# Patient Record
Sex: FEMALE | Race: BLACK OR AFRICAN AMERICAN | URBAN - METROPOLITAN AREA
[De-identification: names, ages, dates, MRNs, and addresses within clinical notes are randomized per-mention and may not be internally consistent; named-entity substitution may affect disease eponyms.]

---

## 2022-03-20 PROBLEM — E11.65 TYPE 2 DIABETES MELLITUS WITH HYPERGLYCEMIA, WITHOUT LONG-TERM CURRENT USE OF INSULIN (HCC): Status: ACTIVE | Noted: 2017-03-20

## 2022-11-21 LAB
CREATININE, EXTERNAL: 0.77
HBA1C MFR BLD HPLC: 6.9 %
LDL CHOLESTEROL, EXTERNAL: 54
MICROALBUMIN URINE, EXTERNAL: 68.2

## 2023-03-28 ENCOUNTER — VIRTUAL VISIT (OUTPATIENT)
Dept: ENDOCRINOLOGY | Age: 63
End: 2023-03-28
Payer: OTHER GOVERNMENT

## 2023-03-28 DIAGNOSIS — E11.65 TYPE 2 DIABETES MELLITUS WITH HYPERGLYCEMIA, WITHOUT LONG-TERM CURRENT USE OF INSULIN (HCC): Primary | ICD-10-CM

## 2023-03-28 DIAGNOSIS — E78.2 MIXED HYPERLIPIDEMIA: ICD-10-CM

## 2023-03-28 DIAGNOSIS — I10 ESSENTIAL HYPERTENSION: ICD-10-CM

## 2023-03-28 PROCEDURE — 99214 OFFICE O/P EST MOD 30 MIN: CPT | Performed by: INTERNAL MEDICINE

## 2023-03-28 RX ORDER — RISANKIZUMAB-RZAA 150 MG/ML
INJECTION SUBCUTANEOUS
COMMUNITY
Start: 2022-12-27

## 2023-03-28 NOTE — PROGRESS NOTES
Adolfo Anders is a 58 y.o. female here for   Chief Complaint   Patient presents with    Diabetes       1. Have you been to the ER or an urgent care clinic since your last visit?  - no    2. Have you been hospitalized since your last visit? - no    3. Have you seen or consulted any other health care providers outside of the [address] [place] [address] since your last visit?   Include any pap smears or colon screening.-  cardiologist, neurologist, GYN

## 2023-03-28 NOTE — PROGRESS NOTES
Jessica Galvan MD        Patient Information  Date:3/29/2023  Name : Mala Yoder 58 y.o.     YOB: 1960         Referred by: MIHIR Champion       Pursuant to the emergency declaration under the [facility], [facility] waiver authority and the [employer] Act, this Virtual  Visit was conducted, with patient's consent, to reduce the patient's risk of exposure to COVID-19 . Patient  is aware that this is a billable encounter and is responsible for copays/deductibles       Services were provided through a video synchronous discussion virtually to substitute for in-person clinic visit. Place of service: Provider : Office  Patient: Home  Chief Complaint   Patient presents with    Diabetes           History of Present Illness: Mala Yoder is a 58 y.o. female here for fu of  Type 2 Diabetes Mellitus. Type 2 Diabetes was diagnosed in 2009  . Cardiovascular risk factors: diabetes mellitus   Monitoring frequency:1-2 /day, fasting < 120   CVA January 2021 s/p carotid endarterectomy  Cold symptoms and hence had to change to virtual visit  Having some diarrhea  On biologics for psoriasis   Compliant with the medications  No hypoglycemia    Psoriasis: Followed by dermatology    Wt Readings from Last 3 Encounters:   07/13/22 155 lb (70.3 kg)   02/28/22 159 lb (72.1 kg)   10/27/21 161 lb (73 kg)       BP Readings from Last 3 Encounters:   07/13/22 127/69   02/28/22 113/62   10/27/21 (!) 115/57           Past Medical History:   Diagnosis Date    Diabetes ([facility] [address]     Psoriasis     Stroke [facility] 01/2021    4 strokes     Current Outpatient Medications   Medication Sig    empagliflozin-metformin (Synjardy XR) 12.5-1,000 mg TBph Take 1 Tablet by mouth two (2) times a day.     dulaglutide (Trulicity) 1.5 ZN/0.9 mL sub-q pen 0.5 mL by SubCUTAneous route every seven (7) days. Stop 0.75 mg    Skyrizi 150 mg/mL pnij Once every 3 months    clopidogreL (PLAVIX) 75 mg tab Take 75 mg by mouth daily. atorvastatin (Lipitor) 40 mg tablet Take 1 Tablet by mouth nightly. amLODIPine-valsartan (EXFORGE) 5-320 mg per tablet Take 1 Tablet by mouth daily. levETIRAcetam (KEPPRA) 500 mg tablet Take 1,000 mg by mouth two (2) times a day. clobetasol (TEMOVATE) 0.05 % ointment Apply  to affected area as needed for Skin Irritation. glucose blood VI test strips (ONETOUCH ULTRA TEST) strip Test blood glucose 2 times daily Dx Code: E11.65    Lancets misc Test blood glucose twice daily Dx Code: E11.65    Biotin 2,500 mcg cap Take 5,000 mcg by mouth daily. triamcinolone (KENALOG) 0.1 % lotion Apply  to affected area as needed. use thin layer (Patient not taking: Reported on 3/28/2023)     No current facility-administered medications for this visit. No Known Allergies      Review of Systems:  Per HPI     Physical Examination:   There were no vitals taken for this visit. Estimated body mass index is 27.46 kg/m² as calculated from the following:    Height as of 7/13/22: 5' 3\" (1.6 m). Weight as of 7/13/22: 155 lb (70.3 kg). General: pleasant, no distress, good eye contact  HEENT: no exophthalmos, no periorbital edema, EOMI  Neck: No visible thyromegaly  RS: Normal respiratory effort  Musculoskeletal: no tremors  Neurological: alert and oriented  Psychiatric: normal mood and affect  Skin: Normal color  Data Reviewed:         A1C 14 10/15    Assessment/Plan:     1. Type 2 diabetes mellitus with hyperglycemia, without long-term current use of insulin ([address]    2. Mixed hyperlipidemia    3. Essential hypertension          1.  Type 2 Diabetes Mellitus   Lab Results   Component Value Date/Time    Hemoglobin A1c 7.2 (H) 11/27/2019 08:20 AM    Hemoglobin A1c (POC) 6.4 07/13/2022 03:26 PM    Hemoglobin A1c, External 7.7 02/25/2022 12:00 AM   controlled, A1c 2023 6.8  Synjardy -trulicity:  T6Z 11/2023 6.9      FLU annually ,Pneumovax ,aspirin daily,annual eye exam,microalbumin    2. HTN :  Prinzide    3 Hyperlipidemia -statin    4 Psoriasis - followed by Derm    5. Carotid artery stenosis - s/p endarterectomy     6. CVA:    Dr Margie Vance - Cardiologist     Diarrhea: Could be medication related, could hold Synjardy for 2 weeks to see if it is related to the metformin, patient not interested    There are no Patient Instructions on file for this visit. Follow-up and Dispositions    Return for 4-5 months f/up with fasting labs bnv. Thank you for allowing me to participate in the care of this patient.     Hassan Dakin, MD    Patient verbalized understanding

## 2023-03-28 NOTE — Clinical Note
4 to 5 months follow-up visit, labs before next visit
no abnormal taste sensation/no gum bleeding/no nose bleeds/no tinnitus

## 2023-03-29 RX ORDER — EMPAGLIFLOZIN, METFORMIN HYDROCHLORIDE 12.5; 1 MG/1; MG/1
1 TABLET, EXTENDED RELEASE ORAL 2 TIMES DAILY
Qty: 180 EACH | Refills: 3 | Status: SHIPPED | OUTPATIENT
Start: 2023-03-29

## 2023-03-29 RX ORDER — DULAGLUTIDE 1.5 MG/.5ML
1.5 INJECTION, SOLUTION SUBCUTANEOUS
Qty: 6 ML | Refills: 3 | Status: SHIPPED | OUTPATIENT
Start: 2023-03-29

## 2023-04-23 DIAGNOSIS — E11.65 TYPE 2 DIABETES MELLITUS WITH HYPERGLYCEMIA, WITHOUT LONG-TERM CURRENT USE OF INSULIN (HCC): Primary | ICD-10-CM

## 2023-04-23 DIAGNOSIS — E78.2 MIXED HYPERLIPIDEMIA: ICD-10-CM

## 2023-04-24 DIAGNOSIS — E78.2 MIXED HYPERLIPIDEMIA: ICD-10-CM

## 2023-04-24 DIAGNOSIS — E11.65 TYPE 2 DIABETES MELLITUS WITH HYPERGLYCEMIA, WITHOUT LONG-TERM CURRENT USE OF INSULIN (HCC): Primary | ICD-10-CM

## 2023-05-23 DIAGNOSIS — E11.65 TYPE 2 DIABETES MELLITUS WITH HYPERGLYCEMIA, WITHOUT LONG-TERM CURRENT USE OF INSULIN (HCC): Primary | ICD-10-CM

## 2023-05-23 RX ORDER — ATORVASTATIN CALCIUM 40 MG/1
40 TABLET, FILM COATED ORAL DAILY
Qty: 90 TABLET | Refills: 3 | Status: SHIPPED | OUTPATIENT
Start: 2023-05-23

## 2023-05-24 NOTE — TELEPHONE ENCOUNTER
Left VM informing pt rx has been sent in as requested. A callback number was left for any questions or concerns.

## 2023-07-18 ENCOUNTER — TELEPHONE (OUTPATIENT)
Age: 63
End: 2023-07-18

## 2023-07-18 NOTE — TELEPHONE ENCOUNTER
Pt left VM stating she needs an alternative for her Synjardy. She feels like she is choking to death when trying to take it and it is causing a lot of irritation. She did not take her dose for today as she is worried about choking. She tried cutting it in half in the past but stopped as she found out it is time released and she's not supposed to do so. Would like bonifacio back asap. With an alternate smaller tab.

## 2023-07-18 NOTE — TELEPHONE ENCOUNTER
Attempted to call. Unsuccessful. Pt's full name on VM. Left detailed msg with Dr. Carpio Exon note. A callback number was left for any questions or concerns.

## 2023-07-18 NOTE — TELEPHONE ENCOUNTER
Although it is extended release she can still cut it if it is bothering her to swallow     If she does not cut it works 24 hours, if she cuts the pill at work 12 hours and hence she has to take 1 pill with breakfast and 1 pill with dinner      If she wants to change it then Jardiance 25 mg in the morning  Metformin extended release 750 mg twice daily with the food

## 2023-07-19 NOTE — TELEPHONE ENCOUNTER
Informed pt of Dr. Liv Voss note. Pt verbalized understanding with no further questions or concerns at this time.

## 2023-09-12 ENCOUNTER — OFFICE VISIT (OUTPATIENT)
Age: 63
End: 2023-09-12
Payer: OTHER GOVERNMENT

## 2023-09-12 VITALS
SYSTOLIC BLOOD PRESSURE: 124 MMHG | BODY MASS INDEX: 27.57 KG/M2 | WEIGHT: 155.6 LBS | TEMPERATURE: 97.8 F | OXYGEN SATURATION: 97 % | RESPIRATION RATE: 20 BRPM | HEART RATE: 75 BPM | HEIGHT: 63 IN | DIASTOLIC BLOOD PRESSURE: 70 MMHG

## 2023-09-12 DIAGNOSIS — I10 ESSENTIAL HYPERTENSION: ICD-10-CM

## 2023-09-12 DIAGNOSIS — E11.65 TYPE 2 DIABETES MELLITUS WITH HYPERGLYCEMIA, UNSPECIFIED WHETHER LONG TERM INSULIN USE (HCC): Primary | ICD-10-CM

## 2023-09-12 DIAGNOSIS — E11.65 TYPE 2 DIABETES MELLITUS WITH HYPERGLYCEMIA, WITHOUT LONG-TERM CURRENT USE OF INSULIN (HCC): ICD-10-CM

## 2023-09-12 DIAGNOSIS — E78.2 MIXED HYPERLIPIDEMIA: ICD-10-CM

## 2023-09-12 PROCEDURE — 3078F DIAST BP <80 MM HG: CPT | Performed by: INTERNAL MEDICINE

## 2023-09-12 PROCEDURE — 99215 OFFICE O/P EST HI 40 MIN: CPT | Performed by: INTERNAL MEDICINE

## 2023-09-12 PROCEDURE — 3074F SYST BP LT 130 MM HG: CPT | Performed by: INTERNAL MEDICINE

## 2023-09-12 RX ORDER — DULAGLUTIDE 0.75 MG/.5ML
0.75 INJECTION, SOLUTION SUBCUTANEOUS
Qty: 6 ML | Refills: 3 | Status: SHIPPED | OUTPATIENT
Start: 2023-09-12

## 2023-09-12 RX ORDER — ATORVASTATIN CALCIUM 40 MG/1
40 TABLET, FILM COATED ORAL DAILY
Qty: 90 TABLET | Refills: 3 | Status: SHIPPED | OUTPATIENT
Start: 2023-09-12

## 2023-09-12 RX ORDER — RISANKIZUMAB-RZAA 150 MG/ML
150 INJECTION SUBCUTANEOUS
COMMUNITY
Start: 2023-06-06

## 2023-09-12 NOTE — PROGRESS NOTES
Miracle Barber is a 61 y.o. female here for   Chief Complaint   Patient presents with    Diabetes       1. Have you been to the ER, urgent care clinic since your last visit? Hospitalized since your last visit? - no    2. Have you seen or consulted any other health care providers outside of the [address] [place] [address] Avenue since your last visit?   Include any pap smears or colon screening.- no
FLU annually ,Pneumovax ,aspirin daily,annual eye exam,microalbumin      2. HTN  :  valsartan       3 Hyperlipidemia -statin      4 Psoriasis - followed by Derm      5. Carotid artery stenosis - s/p endarterectomy       6. CVA:      Dr Radha Julio - Cardiologist   Diarrhea: Dietary changes, if no improvement, hold Synjardy for 2 weeks, then if no improvement hold Trulicity for 2 weeks  She will maintain a diary      There are no Patient Instructions on file for this visit. Spent > 40 minutes on the day of the visit reviewing chart, examining, ordering/reviewing labs, counseling, discussing therapeutics and documentation in the medical record   Thank you for allowing me to participate in the care of this patient.       Ubaldo Gil MD      Patient verbalized understanding

## 2023-11-30 LAB — HBA1C MFR BLD HPLC: 7.2 %

## 2023-12-08 ENCOUNTER — OFFICE VISIT (OUTPATIENT)
Age: 63
End: 2023-12-08
Payer: OTHER GOVERNMENT

## 2023-12-08 VITALS
DIASTOLIC BLOOD PRESSURE: 77 MMHG | OXYGEN SATURATION: 98 % | BODY MASS INDEX: 28.79 KG/M2 | WEIGHT: 162.5 LBS | TEMPERATURE: 98.1 F | HEART RATE: 76 BPM | RESPIRATION RATE: 15 BRPM | HEIGHT: 63 IN | SYSTOLIC BLOOD PRESSURE: 140 MMHG

## 2023-12-08 DIAGNOSIS — E78.2 MIXED HYPERLIPIDEMIA: ICD-10-CM

## 2023-12-08 DIAGNOSIS — E11.65 TYPE 2 DIABETES MELLITUS WITH HYPERGLYCEMIA, WITHOUT LONG-TERM CURRENT USE OF INSULIN (HCC): Primary | ICD-10-CM

## 2023-12-08 DIAGNOSIS — I10 ESSENTIAL HYPERTENSION: ICD-10-CM

## 2023-12-08 PROCEDURE — 3077F SYST BP >= 140 MM HG: CPT | Performed by: INTERNAL MEDICINE

## 2023-12-08 PROCEDURE — 99215 OFFICE O/P EST HI 40 MIN: CPT | Performed by: INTERNAL MEDICINE

## 2023-12-08 PROCEDURE — 3078F DIAST BP <80 MM HG: CPT | Performed by: INTERNAL MEDICINE

## 2023-12-08 RX ORDER — DULAGLUTIDE 1.5 MG/.5ML
1.5 INJECTION, SOLUTION SUBCUTANEOUS WEEKLY
Qty: 6 ML | Refills: 3 | Status: SHIPPED | OUTPATIENT
Start: 2023-12-08

## 2024-02-06 LAB
BUN SERPL-MCNC: 10 MG/DL (ref 8–27)
BUN/CREAT SERPL: 13 (ref 12–28)
CALCIUM SERPL-MCNC: 9.7 MG/DL (ref 8.7–10.3)
CHLORIDE SERPL-SCNC: 102 MMOL/L (ref 96–106)
CO2 SERPL-SCNC: 20 MMOL/L (ref 20–29)
CREAT SERPL-MCNC: 0.76 MG/DL (ref 0.57–1)
EGFRCR SERPLBLD CKD-EPI 2021: 88 ML/MIN/1.73
GLUCOSE SERPL-MCNC: 129 MG/DL (ref 70–99)
HBA1C MFR BLD: 7.1 % (ref 4.8–5.6)
LDLC SERPL DIRECT ASSAY-MCNC: 47 MG/DL (ref 0–99)
POTASSIUM SERPL-SCNC: 4.2 MMOL/L (ref 3.5–5.2)
SODIUM SERPL-SCNC: 140 MMOL/L (ref 134–144)

## 2024-02-07 LAB
ALBUMIN/CREAT UR: 26 MG/G CREAT (ref 0–29)
CREAT UR-MCNC: 42.2 MG/DL
MICROALBUMIN UR-MCNC: 11.1 UG/ML

## 2024-02-21 DIAGNOSIS — E11.65 TYPE 2 DIABETES MELLITUS WITH HYPERGLYCEMIA, WITHOUT LONG-TERM CURRENT USE OF INSULIN (HCC): Primary | ICD-10-CM

## 2024-02-21 RX ORDER — EMPAGLIFLOZIN, METFORMIN HYDROCHLORIDE 12.5; 1 MG/1; MG/1
TABLET, EXTENDED RELEASE ORAL
Qty: 180 TABLET | Refills: 3 | Status: SHIPPED | OUTPATIENT
Start: 2024-02-21

## 2024-02-26 DIAGNOSIS — E11.65 TYPE 2 DIABETES MELLITUS WITH HYPERGLYCEMIA, WITHOUT LONG-TERM CURRENT USE OF INSULIN (HCC): ICD-10-CM

## 2024-02-26 DIAGNOSIS — E78.2 MIXED HYPERLIPIDEMIA: ICD-10-CM

## 2024-02-26 DIAGNOSIS — I10 ESSENTIAL HYPERTENSION: ICD-10-CM

## 2024-02-28 ENCOUNTER — TELEPHONE (OUTPATIENT)
Age: 64
End: 2024-02-28

## 2024-02-28 DIAGNOSIS — E11.65 TYPE 2 DIABETES MELLITUS WITH HYPERGLYCEMIA, WITHOUT LONG-TERM CURRENT USE OF INSULIN (HCC): Primary | ICD-10-CM

## 2024-02-28 NOTE — TELEPHONE ENCOUNTER
Patient would like to have further instruction on medication since unable to get the Trulicity filled

## 2024-02-29 NOTE — TELEPHONE ENCOUNTER
Informed pt of Dr. Berry's note. Pt verbalized understanding with no further questions or concerns at this time.

## 2024-03-05 DIAGNOSIS — E11.65 TYPE 2 DIABETES MELLITUS WITH HYPERGLYCEMIA, WITHOUT LONG-TERM CURRENT USE OF INSULIN (HCC): ICD-10-CM

## 2024-03-05 DIAGNOSIS — E78.2 MIXED HYPERLIPIDEMIA: ICD-10-CM

## 2024-03-11 ENCOUNTER — OFFICE VISIT (OUTPATIENT)
Age: 64
End: 2024-03-11
Payer: OTHER GOVERNMENT

## 2024-03-11 VITALS
TEMPERATURE: 97.4 F | OXYGEN SATURATION: 91 % | BODY MASS INDEX: 28.46 KG/M2 | HEIGHT: 63 IN | SYSTOLIC BLOOD PRESSURE: 127 MMHG | DIASTOLIC BLOOD PRESSURE: 63 MMHG | RESPIRATION RATE: 18 BRPM | WEIGHT: 160.6 LBS | HEART RATE: 82 BPM

## 2024-03-11 DIAGNOSIS — E11.65 TYPE 2 DIABETES MELLITUS WITH HYPERGLYCEMIA, WITHOUT LONG-TERM CURRENT USE OF INSULIN (HCC): Primary | ICD-10-CM

## 2024-03-11 DIAGNOSIS — I10 ESSENTIAL HYPERTENSION: ICD-10-CM

## 2024-03-11 DIAGNOSIS — E78.2 MIXED HYPERLIPIDEMIA: ICD-10-CM

## 2024-03-11 PROCEDURE — 3078F DIAST BP <80 MM HG: CPT | Performed by: INTERNAL MEDICINE

## 2024-03-11 PROCEDURE — 3074F SYST BP LT 130 MM HG: CPT | Performed by: INTERNAL MEDICINE

## 2024-03-11 PROCEDURE — 99214 OFFICE O/P EST MOD 30 MIN: CPT | Performed by: INTERNAL MEDICINE

## 2024-03-11 PROCEDURE — 3051F HG A1C>EQUAL 7.0%<8.0%: CPT | Performed by: INTERNAL MEDICINE

## 2024-03-11 RX ORDER — DULAGLUTIDE 3 MG/.5ML
3 INJECTION, SOLUTION SUBCUTANEOUS WEEKLY
Qty: 6 ML | Refills: 3 | Status: SHIPPED | OUTPATIENT
Start: 2024-03-11

## 2024-03-11 RX ORDER — ATORVASTATIN CALCIUM 40 MG/1
40 TABLET, FILM COATED ORAL DAILY
Qty: 90 TABLET | Refills: 3 | Status: SHIPPED | OUTPATIENT
Start: 2024-03-11

## 2024-03-11 NOTE — PROGRESS NOTES
[facility] DIABETES AND ENDOCRINOLOGY                 Bronwyn Berry MD            Patient Information   Date:7/13/2022   Name : Judy Corado 61 y.o.       YOB: 1960           Referred by: Isael Munoz PA                Chief Complaint   Patient presents with    Diabetes              History of Present Illness: Judy Corado  is here for fu of   Type 2 Diabetes Mellitus.       Type 2 Diabetes was diagnosed in 2009  .   Cardiovascular risk factors: diabetes mellitus    Monitoring frequency:1 /day, fasting <120-140   CVA January 2021 s/p carotid endarterectomy  Diarrhea: No real correlation to Trulicity or Synjardy, she thinks it might be related to ingestion of Pear  Diarrhea started even before she started  extended-release Synjardy  Followed by cardiology and neurology  No nausea vomiting, interested in increasing the Trulicity dose      On Humira for psoriasis           Physical Examination:        General: pleasant, no distress, good eye contact    HEENT: no exophthalmos, no periorbital edema, EOMI    Neck: No thyromegaly    CVS: S1-S2 regular        RS: Normal respiratory effort    Musculoskeletal: no tremors    Neurological: alert and oriented    Psychiatric: normal mood and affect    Skin: Normal color      Data Reviewed:          Lab Results   Component Value Date    GFRAA >60 01/16/2021        Lab Results   Component Value Date    LABA1C 7.1 (H) 02/05/2024    LABA1C 7.2 (H) 11/27/2019    LABA1C 8.3 (H) 07/03/2019         Lab Results   Component Value Date    LDLCALC 61 07/03/2019    TRIG 119 07/03/2019    HDL 41 07/03/2019     02/05/2024    K 4.2 02/05/2024     02/05/2024    CO2 20 02/05/2024    BUN 10 02/05/2024    CREATININE 0.76 02/05/2024    GFRAA >60 01/16/2021    GLUCOSE 129 (H) 02/05/2024    CALCIUM 9.7 02/05/2024    MALBCR 26 02/05/2024               A1c September 2023: 7.1, lipid panel normal, microalbuminuria      Assessment/Plan:

## 2024-03-14 DIAGNOSIS — E11.65 TYPE 2 DIABETES MELLITUS WITH HYPERGLYCEMIA, WITHOUT LONG-TERM CURRENT USE OF INSULIN (HCC): ICD-10-CM

## 2024-03-14 RX ORDER — EMPAGLIFLOZIN, METFORMIN HYDROCHLORIDE 12.5; 1 MG/1; MG/1
1 TABLET, EXTENDED RELEASE ORAL 2 TIMES DAILY
Qty: 180 TABLET | Refills: 3 | Status: SHIPPED | OUTPATIENT
Start: 2024-03-14

## 2024-03-14 NOTE — TELEPHONE ENCOUNTER
Patient did not realize she also needs refill on [facility] when in office on monday

## 2024-06-11 DIAGNOSIS — E11.65 TYPE 2 DIABETES MELLITUS WITH HYPERGLYCEMIA, WITHOUT LONG-TERM CURRENT USE OF INSULIN (HCC): Primary | ICD-10-CM

## 2024-06-11 DIAGNOSIS — E78.2 MIXED HYPERLIPIDEMIA: ICD-10-CM

## 2024-06-13 ENCOUNTER — TELEPHONE (OUTPATIENT)
Age: 64
End: 2024-06-13

## 2024-06-13 LAB
ALBUMIN SERPL-MCNC: 3.5 G/DL (ref 3.9–4.9)
ALBUMIN/GLOB SERPL: 0.9 {RATIO}
ALP SERPL-CCNC: 94 IU/L (ref 44–121)
ALT SERPL-CCNC: 18 IU/L (ref 0–32)
AST SERPL-CCNC: 28 IU/L (ref 0–40)
BILIRUB SERPL-MCNC: 0.3 MG/DL (ref 0–1.2)
BUN SERPL-MCNC: 8 MG/DL (ref 8–27)
BUN/CREAT SERPL: 12 (ref 12–28)
CALCIUM SERPL-MCNC: 9.2 MG/DL (ref 8.7–10.3)
CHLORIDE SERPL-SCNC: 97 MMOL/L (ref 96–106)
CHOLEST SERPL-MCNC: 89 MG/DL (ref 100–199)
CO2 SERPL-SCNC: 24 MMOL/L (ref 20–29)
CREAT SERPL-MCNC: 0.66 MG/DL (ref 0.57–1)
EGFRCR SERPLBLD CKD-EPI 2021: 99 ML/MIN/1.73
GLOBULIN SER CALC-MCNC: 3.9 G/DL (ref 1.5–4.5)
HBA1C MFR BLD: 7 % (ref 4.8–5.6)
HDLC SERPL-MCNC: 36 MG/DL
IMP & REVIEW OF LAB RESULTS: NORMAL
LDLC SERPL CALC-MCNC: 31 MG/DL (ref 0–99)
Lab: NORMAL
POTASSIUM SERPL-SCNC: 4.7 MMOL/L (ref 3.5–5.2)
PROT SERPL-MCNC: 7.4 G/DL (ref 6–8.5)
SODIUM SERPL-SCNC: 136 MMOL/L (ref 134–144)
SPECIMEN STATUS REPORT: NORMAL
TRIGL SERPL-MCNC: 126 MG/DL (ref 0–149)
VLDLC SERPL CALC-MCNC: 22 MG/DL (ref 5–40)

## 2024-06-13 NOTE — TELEPHONE ENCOUNTER
Synjardy is being requested in a smaller mg by patient. She no longer wants the XR. She would like to immediate release. Please call to confirm and send to pharmacy. Thank you

## 2024-06-13 NOTE — TELEPHONE ENCOUNTER
Per pt [facility] out of Trulicity she was advised she could take Januvia when unavailable and asking for script to be sent in

## 2024-06-13 NOTE — TELEPHONE ENCOUNTER
Pt called to inform about if her recent labs are abnormal its because she was in [facility] for 10 days and was treated for a UTI, Sepsis, and Kidney Infection. Did take an antibiotics for duration of hospital stay and stopped on 6/11/2024.

## 2024-06-14 NOTE — TELEPHONE ENCOUNTER
Per Dr Berry informed pt that she will discuss this with her at her appt next week. Pt verbalized understanding with no further questions or concerns at this time.

## 2024-06-21 ENCOUNTER — OFFICE VISIT (OUTPATIENT)
Age: 64
End: 2024-06-21
Payer: OTHER GOVERNMENT

## 2024-06-21 VITALS
HEART RATE: 77 BPM | BODY MASS INDEX: 25.52 KG/M2 | OXYGEN SATURATION: 99 % | SYSTOLIC BLOOD PRESSURE: 107 MMHG | TEMPERATURE: 98 F | WEIGHT: 144 LBS | DIASTOLIC BLOOD PRESSURE: 60 MMHG | HEIGHT: 63 IN

## 2024-06-21 DIAGNOSIS — E11.65 TYPE 2 DIABETES MELLITUS WITH HYPERGLYCEMIA, UNSPECIFIED WHETHER LONG TERM INSULIN USE (HCC): Primary | ICD-10-CM

## 2024-06-21 DIAGNOSIS — E78.2 MIXED HYPERLIPIDEMIA: ICD-10-CM

## 2024-06-21 DIAGNOSIS — I10 ESSENTIAL HYPERTENSION: ICD-10-CM

## 2024-06-21 PROCEDURE — 3051F HG A1C>EQUAL 7.0%<8.0%: CPT | Performed by: INTERNAL MEDICINE

## 2024-06-21 PROCEDURE — 99215 OFFICE O/P EST HI 40 MIN: CPT | Performed by: INTERNAL MEDICINE

## 2024-06-21 PROCEDURE — 3074F SYST BP LT 130 MM HG: CPT | Performed by: INTERNAL MEDICINE

## 2024-06-21 PROCEDURE — 3078F DIAST BP <80 MM HG: CPT | Performed by: INTERNAL MEDICINE

## 2024-06-21 RX ORDER — EMPAGLIFLOZIN AND METFORMIN HYDROCHLORIDE 12.5; 1 MG/1; MG/1
TABLET ORAL
Qty: 60 TABLET | Refills: 2 | Status: SHIPPED | OUTPATIENT
Start: 2024-06-21

## 2024-06-21 NOTE — PROGRESS NOTES
[facility] DIABETES AND ENDOCRINOLOGY                 Bronwyn Berry MD            Patient Information     Name : Judy Corado    YOB: 1960           Referred by: Isael Munoz PA                Chief Complaint   Patient presents with    Diabetes              History of Present Illness: Judy Corado  is here for fu of   Type 2 Diabetes Mellitus.       Type 2 Diabetes was diagnosed in 2009  .   Cardiovascular risk factors: diabetes mellitus    Monitoring frequency:1 /day, fasting <120-140   CVA January 2021 s/p carotid endarterectomy  Diarrhea: No real correlation to Trulicity or Synjardy,  Diarrhea started even before she started  extended-release Synjardy  Followed by cardiology and neurology    She had UTI/pyelonephritis/sepsis, antibiotics helped with the diarrhea  too, she had chronic diarrhea      On Humira for psoriasis           Physical Examination:        General: pleasant, no distress, good eye contact    HEENT: no exophthalmos, no periorbital edema, EOMI    Neck: No thyromegaly    CVS: S1-S2 regular        RS: Normal respiratory effort    Musculoskeletal: no tremors    Neurological: alert and oriented    Psychiatric: normal mood and affect    Skin: Normal color      Data Reviewed:          Lab Results   Component Value Date    GFRAA >60 01/16/2021        Lab Results   Component Value Date    LABA1C 7.0 (H) 06/12/2024    LABA1C 7.1 (H) 02/05/2024    LABA1C 7.2 (H) 11/27/2019         Lab Results   Component Value Date    TRIG 126 06/12/2024    HDL 36 (L) 06/12/2024     06/12/2024    K 4.7 06/12/2024    CL 97 06/12/2024    CO2 24 06/12/2024    BUN 8 06/12/2024    CREATININE 0.66 06/12/2024    GFRAA >60 01/16/2021    GLUCOSE 106 (H) 06/12/2024    CALCIUM 9.2 06/12/2024    MALBCR 26 02/05/2024        Reviewed Trulicity ER records       A1c September 2023: 7.1, lipid panel normal, microalbuminuria      Assessment/Plan:         1. Type 2 diabetes

## 2024-06-21 NOTE — PROGRESS NOTES
I have reviewed all needed documentation in preparation for visit. Verified patient by name and date of birth  Judy Anne is a 63 y.o. female Diabetes  .    Vitals:    06/21/24 1308   BP: 107/60   Pulse: 77   Temp: 98 °F (36.7 °C)   SpO2: 99%   Weight: 65.3 kg (144 lb)   Height: 1.6 m (5' 3\")       No LMP recorded.         Health Maintenance Review: Patient reminded of \"due or due soon\" health maintenance. I have asked the patient to contact his/her primary care provider (PCP) for follow-up on his/her health maintenance.    \"Have you been to the ER, urgent care clinic since your last visit?  Hospitalized since your last visit?\"    YES - When: approximately 5/24/24 ago.  Where and Why: [facility] for pnuemia and kidney infection..    “Have you seen or consulted any other health care providers outside of [facility] since your last visit?”    NO

## 2024-09-04 DIAGNOSIS — E11.65 TYPE 2 DIABETES MELLITUS WITH HYPERGLYCEMIA, UNSPECIFIED WHETHER LONG TERM INSULIN USE (HCC): ICD-10-CM

## 2024-09-04 RX ORDER — EMPAGLIFLOZIN AND METFORMIN HYDROCHLORIDE 12.5; 1 MG/1; MG/1
TABLET ORAL
Qty: 180 TABLET | Refills: 3 | Status: SHIPPED | OUTPATIENT
Start: 2024-09-04

## 2024-10-10 NOTE — PRE-PROCEDURE INSTRUCTIONS
Pat completed with patient for procedure on 10/15/24. Patient verbalized understanding of arrival time of 1200, npo status, and the need for a ride home.

## 2024-10-14 ENCOUNTER — ANESTHESIA EVENT (OUTPATIENT)
Facility: HOSPITAL | Age: 64
End: 2024-10-14
Payer: OTHER GOVERNMENT

## 2024-10-14 NOTE — ANESTHESIA PRE PROCEDURE
Department of Anesthesiology  Preprocedure Note       Name:  Judy Anne   Age:  64 y.o.  :  1960                                          MRN:  [id number]         Date:  10/14/2024      Surgeon: Surgeon(s):  Emmanuelle Damon MD    Procedure: Procedure(s):  ESOPHAGOGASTRODUODENOSCOPY WITH BIOPSY    Medications prior to admission:   Prior to Admission medications    Medication Sig Start Date End Date Taking? Authorizing Provider   Empagliflozin-metFORMIN HCl (SYNJARDY) 12.5-1000 MG TABS TAKE 1 TABLET TWICE A DAY WITH MEALS (STOP XR) 24   Bronwyn Berry MD   Cyanocobalamin (VITAMIN B-12 PO) Take 3,000 mcg by mouth daily    ProviderJared MD   atorvastatin (LIPITOR) 40 MG tablet Take 1 tablet by mouth daily 3/11/24   Bronwyn Berry MD   Dulaglutide (TRULICITY) 3 MG/0.5ML SOPN Inject 3 mg into the skin once a week Stop 1.5 mg 3/11/24   Bronwyn Berry MD   SITagliptin (JANUVIA) 100 MG tablet Take 1 tablet by mouth daily While Trulicity is on backorder. Stop once back on Trulicty  Patient not taking: Reported on 3/11/2024 2/29/24   Bronwyn Berry MD   SKYRIZI  MG/ML SOAJ Inject 150 mg as directed every 3 months 23   Provider, MD Bogdan Coleman MISC Test blood glucose twice daily Dx Code: E11.65 10/13/16   Automatic Reconciliation, Ar   amLODIPine-valsartan (EXFORGE) 5-320 MG per tablet Take 1 tablet by mouth daily    Automatic Reconciliation, Ar   Biotin 2.5 MG CAPS Take 5,000 mcg by mouth daily    Automatic Reconciliation, Ar   clobetasol (TEMOVATE) 0.05 % ointment Apply topically as needed    Automatic Reconciliation, Ar   clopidogrel (PLAVIX) 75 MG tablet Take 1 tablet by mouth daily    Automatic Reconciliation, Ar   triamcinolone (KENALOG) 0.1 % lotion Apply topically as needed    Automatic Reconciliation, Ar       Current medications:    No current facility-administered medications for this encounter.     Current Outpatient Medications   Medication Sig  ROM: full  Mouth opening: > = 3 FB   Dental:    (+) edentulous      Pulmonary:Negative Pulmonary ROS and normal exam  breath sounds clear to auscultation                             Cardiovascular:    (+) hypertension:        Rhythm: regular  Rate: normal                    Neuro/Psych:   (+) CVA:            GI/Hepatic/Renal: Neg GI/Hepatic/Renal ROS            Endo/Other:    (+) DiabetesType II DM, blood dyscrasia: anticoagulation therapy:..                 Abdominal:             Vascular: negative vascular ROS.         Other Findings:         Anesthesia Plan      MAC and TIVA     ASA 3     (Standard ASA Monitors)  Induction: intravenous.      Anesthetic plan and risks discussed with patient.      Plan discussed with CRNA.    Attending anesthesiologist reviewed and agrees with Preprocedure content              Ruperto Hope MD   10/14/2024

## 2024-10-15 ENCOUNTER — ANESTHESIA (OUTPATIENT)
Facility: HOSPITAL | Age: 64
End: 2024-10-15
Payer: OTHER GOVERNMENT

## 2024-10-15 ENCOUNTER — HOSPITAL ENCOUNTER (OUTPATIENT)
Facility: HOSPITAL | Age: 64
Setting detail: OUTPATIENT SURGERY
Discharge: HOME | End: 2024-10-15
Attending: INTERNAL MEDICINE | Admitting: INTERNAL MEDICINE
Payer: OTHER GOVERNMENT

## 2024-10-15 VITALS
BODY MASS INDEX: 25.69 KG/M2 | RESPIRATION RATE: 18 BRPM | HEART RATE: 98 BPM | OXYGEN SATURATION: 99 % | TEMPERATURE: 97.6 F | WEIGHT: 145 LBS | DIASTOLIC BLOOD PRESSURE: 79 MMHG | SYSTOLIC BLOOD PRESSURE: 153 MMHG

## 2024-10-15 DIAGNOSIS — R13.10 DYSPHAGIA, UNSPECIFIED TYPE: ICD-10-CM

## 2024-10-15 DIAGNOSIS — R10.13 DYSPEPSIA: ICD-10-CM

## 2024-10-15 DIAGNOSIS — R11.2 NAUSEA AND VOMITING, UNSPECIFIED VOMITING TYPE: ICD-10-CM

## 2024-10-15 LAB
GLUCOSE BLD STRIP.AUTO-MCNC: 85 MG/DL (ref 65–100)
PERFORMED BY:: NORMAL

## 2024-10-15 PROCEDURE — 6360000002 HC RX W HCPCS: Performed by: NURSE ANESTHETIST, CERTIFIED REGISTERED

## 2024-10-15 PROCEDURE — 3600007502: Performed by: INTERNAL MEDICINE

## 2024-10-15 PROCEDURE — 7100000010 HC PHASE II RECOVERY - FIRST 15 MIN: Performed by: INTERNAL MEDICINE

## 2024-10-15 PROCEDURE — 82962 GLUCOSE BLOOD TEST: CPT

## 2024-10-15 PROCEDURE — 3600007512: Performed by: INTERNAL MEDICINE

## 2024-10-15 PROCEDURE — 2580000003 HC RX 258: Performed by: INTERNAL MEDICINE

## 2024-10-15 PROCEDURE — 3700000000 HC ANESTHESIA ATTENDED CARE: Performed by: INTERNAL MEDICINE

## 2024-10-15 PROCEDURE — 88305 TISSUE EXAM BY PATHOLOGIST: CPT

## 2024-10-15 PROCEDURE — 3700000001 HC ADD 15 MINUTES (ANESTHESIA): Performed by: INTERNAL MEDICINE

## 2024-10-15 PROCEDURE — 2709999900 HC NON-CHARGEABLE SUPPLY: Performed by: INTERNAL MEDICINE

## 2024-10-15 PROCEDURE — 7100000011 HC PHASE II RECOVERY - ADDTL 15 MIN: Performed by: INTERNAL MEDICINE

## 2024-10-15 PROCEDURE — 2500000003 HC RX 250 WO HCPCS: Performed by: NURSE ANESTHETIST, CERTIFIED REGISTERED

## 2024-10-15 RX ORDER — LIDOCAINE HYDROCHLORIDE 20 MG/ML
INJECTION, SOLUTION EPIDURAL; INFILTRATION; INTRACAUDAL; PERINEURAL
Status: DISCONTINUED | OUTPATIENT
Start: 2024-10-15 | End: 2024-10-15 | Stop reason: SDUPTHER

## 2024-10-15 RX ORDER — SODIUM CHLORIDE, SODIUM LACTATE, POTASSIUM CHLORIDE, CALCIUM CHLORIDE 600; 310; 30; 20 MG/100ML; MG/100ML; MG/100ML; MG/100ML
INJECTION, SOLUTION INTRAVENOUS CONTINUOUS
Status: DISCONTINUED | OUTPATIENT
Start: 2024-10-15 | End: 2024-10-15 | Stop reason: HOSPADM

## 2024-10-15 RX ADMIN — SODIUM CHLORIDE, POTASSIUM CHLORIDE, SODIUM LACTATE AND CALCIUM CHLORIDE: 600; 310; 30; 20 INJECTION, SOLUTION INTRAVENOUS at 13:36

## 2024-10-15 RX ADMIN — LIDOCAINE HYDROCHLORIDE 100 MG: 20 INJECTION, SOLUTION EPIDURAL; INFILTRATION; INTRACAUDAL; PERINEURAL at 13:42

## 2024-10-15 ASSESSMENT — PAIN - FUNCTIONAL ASSESSMENT
PAIN_FUNCTIONAL_ASSESSMENT: NONE - DENIES PAIN

## 2024-11-02 NOTE — ANESTHESIA POSTPROCEDURE EVALUATION
Department of Anesthesiology  Postprocedure Note    Patient: Judy Anne  MRN: [id number]  YOB: 1960    Procedure Summary       Date: 10/15/24 Room / Location: [facility] 03 / [facility] ENDOSCOPY    Anesthesia Start: 1340 Anesthesia Stop: 1355    Procedure: ESOPHAGOGASTRODUODENOSCOPY WITH BIOPSY (Upper GI Region) Diagnosis:       Dyspepsia      Dysphagia, unspecified type      Nausea and vomiting, unspecified vomiting type      (Dyspepsia [R10.13])      (Dysphagia, unspecified type [R13.10])      (Nausea and vomiting, unspecified vomiting type [R11.2])    Surgeons: Emmanuelle Damon MD Responsible Provider: Mark Magana MD    Anesthesia Type: MAC ASA Status: 3            Anesthesia Type: MAC    Serina Phase I: Serina Score: 10    Serina Phase II: Serina Score: 10    Anesthesia Post Evaluation    Patient location during evaluation: bedside (Endoscopy Unit)  Patient participation: complete - patient participated  Level of consciousness: sleepy but conscious  Pain score: 0  Airway patency: patent  Nausea & Vomiting: no nausea and no vomiting  Cardiovascular status: hemodynamically stable  Respiratory status: acceptable  Hydration status: stable  Comments: This patient remained on the stretcher.  The patient was handed off to the endoscopy nursing team.  All questions regarding pre-, intra-, and postoperative care were answered.  Multimodal analgesia pain management approach        No notable events documented.   Hospitalist Discharge Summary     Patient ID:    Mendy Stearns  [id number]  41 y.o.  1983    Admit date: 2/5/2023    Discharge date : 2/7/2023    Final Diagnoses: Active Problems:    Acute alcoholic pancreatitis (1/9/4830)        Hospital Course:   Brittaney Mendoza is a 44-year-old female with a PMHx of pancreatitis, alcohol dependence, and depression presenting to the ED with a cc of worsening abdominal pain x 1 day. Pain described as constant, 7/10, diffuse, non-radiating. Associated with nausea and vomiting. Patient admits to drinking throughout the day. She is eager to quit. Also reports more anxiety and stress lately. She stopped taking clonopin several years ago. Denies any fever, chills, headache, dizziness, SOB, cough,chest pain, palpitations, dysuria, or abdominal pain. In the ED, CBC and CMP unremarkable. Lipase elevated. Started on IVFs. Abdominal pain improving. Repeat lipase within normal limits. Patient counseled on alcohol cessation. Advised close outpatient follow-up with PCP. Discharge Medications:   Current Discharge Medication List        START taking these medications    Details   ALPRAZolam (Xanax) 0.25 mg tablet Take 1 Tablet by mouth two (2) times daily as needed for Anxiety for up to 7 days. Max Daily Amount: 0.5 mg.  Qty: 14 Tablet, Refills: 0  Start date: 2/7/2023, End date: 2/14/2023    Associated Diagnoses: Anxiety      pantoprazole (Protonix) 40 mg tablet Take 1 Tablet by mouth daily for 30 days. Qty: 30 Tablet, Refills: 0  Start date: 2/7/2023, End date: 3/9/2023      guaiFENesin ER (Mucinex) 600 mg ER tablet Take 1 Tablet by mouth two (2) times a day for 3 days. Qty: 6 Tablet, Refills: 0  Start date: 2/7/2023, End date: 2/10/2023      naloxone [facility]) 4 mg/actuation nasal spray Use 1 spray intranasally, then discard. Repeat with new spray every 2 min as needed for opioid overdose symptoms, alternating nostrils.   Qty: 1 Each, Refills: 0  Start date: 2/7/2023           CONTINUE these medications which have NOT CHANGED    Details   methadone (DOLOPHINE) 10 mg/mL solution Take  by mouth.      escitalopram oxalate (LEXAPRO) 10 mg tablet Take 20 mg by mouth daily. Estradiol-Levonorgestrel 0.045-0.015 mg/24 hr 1 Patch by TransDERmal route Every Saturday. traZODone (DESYREL) 100 mg tablet Take 100 mg by mouth nightly. promethazine (PHENERGAN) 25 mg tablet Take 1 Tab by mouth every six (6) hours as needed for Nausea. Qty: 12 Tab, Refills: 0           STOP taking these medications       buPROPion (WELLBUTRIN) 100 mg tablet Comments:   Reason for Stopping:         oxyCODONE-acetaminophen (PERCOCET) 5-325 mg per tablet Comments:   Reason for Stopping: Follow up Care:    1. Kirit Tanner MD in 1-2 weeks. Follow-up Information       Follow up With Specialties Details Why Contact Rosendo Smith MD Gastroenterology Schedule an appointment as soon as possible for a visit  Acute on chronic alcoholic pancreatitis [address] [address]      Kirit Tanner MD Family Medicine Schedule an appointment as soon as possible for a visit Hospital follow-up anxiety, EtOH use. [address]  [phone]      [facility] EMERGENCY DEPT Emergency Medicine  If symptoms worsen [address] [facility] [address]  [phone]              Patient Follow Up Instructions: Activity: Activity as tolerated  Diet:  Resume previous diet  Wound care: None required    Condition at Discharge:  Stable  __________________________________________________________________    Disposition  Home or Self Care  ____________________________________________________________________    Code Status:  Full Code  ___________________________________________________________________    Discharge Exam:  Patient seen and examined by me on discharge day.   Pertinent Findings:    Gen:    Not in distress  Chest: Clear lungs without wheezing, rhonchi, or rales. On room air. CVS:   Regular rate and rhythm. +S1/S2. NO murmur or gallop. No edema  Abd:  Soft, not distended, not tender. Active bowel sounds. Neuro:  Alert and oriented x3. CN II-XII grossly intact. Psych: Mood and affect appropriate     CONSULTATIONS: None    Significant Diagnostic Studies:   Recent Results (from the past 24 hour(s))   GLUCOSE, POC    Collection Time: 02/06/23  4:13 PM   Result Value Ref Range    Glucose (POC) 88 65 - 100 mg/dL    Performed by [employer]    LIPASE    Collection Time: 02/07/23  7:34 AM   Result Value Ref Range    Lipase 295 73 - 393 U/L   CBC W/O DIFF    Collection Time: 02/07/23  7:34 AM   Result Value Ref Range    WBC 8.0 3.6 - 11.0 K/uL    RBC 3.11 (L) 3.80 - 5.20 M/uL    HGB 9.5 (L) 11.5 - 16.0 g/dL    HCT 29.5 (L) 35.0 - 47.0 %    MCV 94.9 80.0 - 99.0 FL    MCH 30.5 26.0 - 34.0 PG    MCHC 32.2 30.0 - 36.5 g/dL    RDW 14.5 11.5 - 14.5 %    PLATELET 993 858 - 568 K/uL    MPV 11.0 8.9 - 12.9 FL    NRBC 0.0 0.0  WBC    ABSOLUTE NRBC 0.00 0.00 - 4.91 K/uL   METABOLIC PANEL, BASIC    Collection Time: 02/07/23  7:34 AM   Result Value Ref Range    Sodium 140 136 - 145 mmol/L    Potassium 3.8 3.5 - 5.1 mmol/L    Chloride 113 (H) 97 - 108 mmol/L    CO2 25 21 - 32 mmol/L    Anion gap 2 (L) 5 - 15 mmol/L    Glucose 105 (H) 65 - 100 mg/dL    BUN 6 6 - 20 mg/dL    Creatinine 0.59 0.55 - 1.02 mg/dL    BUN/Creatinine ratio 10 (L) 12 - 20      eGFR >60 >60 ml/min/1.73m2    Calcium 7.6 (L) 8.5 - 10.1 mg/dL     CT ABD PELV W CONT   Final Result   Acute pancreatitis without fluid collection, gallstones, or pancreatic duct   stones.               Signed:  Víctor Baker PA-C  2/7/2023  11:19 AM

## 2024-11-05 ENCOUNTER — OFFICE VISIT (OUTPATIENT)
Age: 64
End: 2024-11-05
Payer: OTHER GOVERNMENT

## 2024-11-05 VITALS
HEIGHT: 63 IN | HEART RATE: 78 BPM | TEMPERATURE: 98 F | OXYGEN SATURATION: 98 % | BODY MASS INDEX: 26.74 KG/M2 | SYSTOLIC BLOOD PRESSURE: 142 MMHG | DIASTOLIC BLOOD PRESSURE: 63 MMHG | WEIGHT: 150.9 LBS

## 2024-11-05 DIAGNOSIS — I10 ESSENTIAL HYPERTENSION: ICD-10-CM

## 2024-11-05 DIAGNOSIS — E11.65 TYPE 2 DIABETES MELLITUS WITH HYPERGLYCEMIA, UNSPECIFIED WHETHER LONG TERM INSULIN USE (HCC): Primary | ICD-10-CM

## 2024-11-05 DIAGNOSIS — E78.2 MIXED HYPERLIPIDEMIA: ICD-10-CM

## 2024-11-05 LAB — HBA1C MFR BLD: 6.2 %

## 2024-11-05 PROCEDURE — 3078F DIAST BP <80 MM HG: CPT | Performed by: INTERNAL MEDICINE

## 2024-11-05 PROCEDURE — 3051F HG A1C>EQUAL 7.0%<8.0%: CPT | Performed by: INTERNAL MEDICINE

## 2024-11-05 PROCEDURE — 83036 HEMOGLOBIN GLYCOSYLATED A1C: CPT | Performed by: INTERNAL MEDICINE

## 2024-11-05 PROCEDURE — 99214 OFFICE O/P EST MOD 30 MIN: CPT | Performed by: INTERNAL MEDICINE

## 2024-11-05 PROCEDURE — 3077F SYST BP >= 140 MM HG: CPT | Performed by: INTERNAL MEDICINE

## 2024-11-05 NOTE — PROGRESS NOTES
[facility] DIABETES AND ENDOCRINOLOGY                 Bronwyn Berry MD            Patient Information     Name : Judy Cordao    YOB: 1960           Referred by: Isael Munoz PA                Chief Complaint   Patient presents with    Diabetes    Hyperlipidemia              History of Present Illness: Judy Corado  is here for fu of   Type 2 Diabetes Mellitus.       Type 2 Diabetes was diagnosed in 2009  .   Cardiovascular risk factors: diabetes mellitus    Monitoring frequency:1 /day, fasting <120-140   CVA January 2021 s/p carotid endarterectomy  Diarrhea: No real correlation to Trulicity or Synjardy,  November 2024 history  History of Present Illness    She reports a previous salivary gland infection with significant jaw swelling, requiring a 3-day hospital stay and antibiotics.    She mentions a past episode of severe diarrhea, now resolved. She maintains adequate hydration with no signs of dehydration.    She is on Trulicity and Synjardy for diabetes management.           She had UTI/pyelonephritis/sepsis, antibiotics helped with the diarrhea  too, she had chronic diarrhea      On Humira for psoriasis           Physical Examination:        General: pleasant, no distress, good eye contact    HEENT: no exophthalmos, no periorbital edema, EOMI    Neck: No thyromegaly    CVS: S1-S2 regular        RS: Normal respiratory effort    Musculoskeletal: no tremors    Neurological: alert and oriented    Psychiatric: normal mood and affect    Skin: Normal color      Data Reviewed:          Lab Results   Component Value Date    GFRAA >60 01/16/2021        Lab Results   Component Value Date    LABA1C 7.0 (H) 06/12/2024    LABA1C 7.1 (H) 02/05/2024    LABA1C 7.2 (H) 11/27/2019         Lab Results   Component Value Date    TRIG 126 06/12/2024    HDL 36 (L) 06/12/2024     06/12/2024    K 4.7 06/12/2024    CL 97 06/12/2024    CO2 24 06/12/2024    BUN 8 06/12/2024

## 2024-11-05 NOTE — PROGRESS NOTES
Judy Anne is a 64 y.o. female here for   Chief Complaint   Patient presents with    Diabetes    Hyperlipidemia       1. Have you been to the ER, urgent care clinic since your last visit?  Hospitalized since your last visit? -no    2. Have you seen or consulted any other health care providers outside of the [facility] since your last visit?  Include any pap smears or colon screening.- no

## 2025-01-14 LAB
BUN SERPL-MCNC: 12 MG/DL (ref 8–27)
BUN/CREAT SERPL: 13 (ref 12–28)
CALCIUM SERPL-MCNC: 9.3 MG/DL (ref 8.7–10.3)
CHLORIDE SERPL-SCNC: 103 MMOL/L (ref 96–106)
CO2 SERPL-SCNC: 21 MMOL/L (ref 20–29)
CREAT SERPL-MCNC: 0.89 MG/DL (ref 0.57–1)
EGFRCR SERPLBLD CKD-EPI 2021: 72 ML/MIN/1.73
GLUCOSE SERPL-MCNC: 106 MG/DL (ref 70–99)
HBA1C MFR BLD: 6.5 % (ref 4.8–5.6)
LDLC SERPL DIRECT ASSAY-MCNC: 64 MG/DL (ref 0–99)
POTASSIUM SERPL-SCNC: 4.7 MMOL/L (ref 3.5–5.2)
SODIUM SERPL-SCNC: 138 MMOL/L (ref 134–144)

## 2025-01-15 LAB
ALBUMIN/CREAT UR: 40 MG/G CREAT (ref 0–29)
CREAT UR-MCNC: 52.4 MG/DL
MICROALBUMIN UR-MCNC: 20.7 UG/ML

## 2025-01-21 PROBLEM — I65.23 BILATERAL CAROTID ARTERY STENOSIS: Status: ACTIVE | Noted: 2021-01-06

## 2025-01-21 PROBLEM — R56.9 SEIZURE (HCC): Status: ACTIVE | Noted: 2025-01-21

## 2025-01-21 PROBLEM — H25.13 AGE-RELATED NUCLEAR CATARACT, BILATERAL: Status: ACTIVE | Noted: 2025-01-21

## 2025-01-21 PROBLEM — I61.6: Status: ACTIVE | Noted: 2025-01-21

## 2025-01-21 PROBLEM — I67.2 CEREBRAL ATHEROSCLEROSIS: Status: ACTIVE | Noted: 2025-01-21

## 2025-01-21 PROBLEM — K11.20 SIALOADENITIS OF SUBMANDIBULAR GLAND: Status: ACTIVE | Noted: 2024-08-01

## 2025-01-21 PROBLEM — D64.9 ANEMIA: Status: ACTIVE | Noted: 2024-08-16

## 2025-02-05 ENCOUNTER — OFFICE VISIT (OUTPATIENT)
Age: 65
End: 2025-02-05
Payer: OTHER GOVERNMENT

## 2025-02-05 VITALS
HEIGHT: 63 IN | OXYGEN SATURATION: 99 % | BODY MASS INDEX: 28.24 KG/M2 | WEIGHT: 159.4 LBS | DIASTOLIC BLOOD PRESSURE: 59 MMHG | HEART RATE: 76 BPM | TEMPERATURE: 97.4 F | SYSTOLIC BLOOD PRESSURE: 109 MMHG

## 2025-02-05 DIAGNOSIS — E78.2 MIXED HYPERLIPIDEMIA: ICD-10-CM

## 2025-02-05 DIAGNOSIS — I10 ESSENTIAL HYPERTENSION: ICD-10-CM

## 2025-02-05 DIAGNOSIS — E11.65 TYPE 2 DIABETES MELLITUS WITH HYPERGLYCEMIA, WITHOUT LONG-TERM CURRENT USE OF INSULIN (HCC): Primary | ICD-10-CM

## 2025-02-05 PROCEDURE — 99214 OFFICE O/P EST MOD 30 MIN: CPT | Performed by: INTERNAL MEDICINE

## 2025-02-05 PROCEDURE — 3044F HG A1C LEVEL LT 7.0%: CPT | Performed by: INTERNAL MEDICINE

## 2025-02-05 PROCEDURE — 3074F SYST BP LT 130 MM HG: CPT | Performed by: INTERNAL MEDICINE

## 2025-02-05 PROCEDURE — 3078F DIAST BP <80 MM HG: CPT | Performed by: INTERNAL MEDICINE

## 2025-02-05 NOTE — PROGRESS NOTES
[facility] DIABETES AND ENDOCRINOLOGY                 Bronwyn Berry MD            Patient Information     Name : Judy Corado    YOB: 1960           Referred by: Isael Munoz PA                Chief Complaint   Patient presents with    Diabetes              History of Present Illness: Judy Corado  is here for fu of   Type 2 Diabetes Mellitus.       Type 2 Diabetes was diagnosed in 2009  .       History of Present Illness    She reports no significant health changes since her last visit. She adheres to her medication regimen (Synjardy, Trulicity) without adverse effects like nausea or vomiting.  She reports weight fluctuations, preferring to maintain 145 pounds.   MEDICATIONS  Synjardy, Trulicity      Prior history  She had UTI/pyelonephritis/sepsis, antibiotics helped with the diarrhea  too, she had chronic diarrhea      On Humira for psoriasis           Physical Examination:        General: pleasant, no distress, good eye contact    HEENT: no exophthalmos, no periorbital edema, EOMI    Neck: No thyromegaly    CVS: S1-S2 regular        RS: Normal respiratory effort    Musculoskeletal: no tremors    Neurological: alert and oriented    Psychiatric: normal mood and affect    Skin: Normal color      Data Reviewed:          Lab Results   Component Value Date    GFRAA >60 01/16/2021        Lab Results   Component Value Date    LABA1C 6.5 (H) 01/13/2025    LABA1C 7.0 (H) 06/12/2024    LABA1C 7.1 (H) 02/05/2024         Lab Results   Component Value Date    TRIG 126 06/12/2024    HDL 36 (L) 06/12/2024     01/13/2025    K 4.7 01/13/2025     01/13/2025    CO2 21 01/13/2025    BUN 12 01/13/2025    CREATININE 0.89 01/13/2025    GFRAA >60 01/16/2021    GLUCOSE 106 (H) 01/13/2025    CALCIUM 9.3 01/13/2025        Reviewed Trulicity ER records       A1c September 2023: 7.1, lipid panel normal, microalbuminuria      Assessment/Plan:         1. Type 2 diabetes

## 2025-02-05 NOTE — PROGRESS NOTES
Judy Anne is a 64 y.o. female here for   Chief Complaint   Patient presents with    Diabetes       1. Have you been to the ER, urgent care clinic since your last visit?  Hospitalized since your last visit? -No    2. Have you seen or consulted any other health care providers outside of the [facility] since your last visit?  Include any pap smears or colon screening.-No

## 2025-04-07 DIAGNOSIS — E11.65 TYPE 2 DIABETES MELLITUS WITH HYPERGLYCEMIA, WITHOUT LONG-TERM CURRENT USE OF INSULIN (HCC): Primary | ICD-10-CM

## 2025-04-07 RX ORDER — DULAGLUTIDE 3 MG/.5ML
3 INJECTION, SOLUTION SUBCUTANEOUS WEEKLY
Qty: 6 ML | Refills: 3 | Status: SHIPPED | OUTPATIENT
Start: 2025-04-07

## 2025-04-07 NOTE — TELEPHONE ENCOUNTER
Refill on trulicity to [facility], patient wanted to wait at pharmacy advised can be end of day before refill sent over and to check with pharmacist as they sometimes have a waiting period for refills to not wait on the pharmacy

## 2025-05-21 DIAGNOSIS — E11.65 TYPE 2 DIABETES MELLITUS WITH HYPERGLYCEMIA, WITHOUT LONG-TERM CURRENT USE OF INSULIN (HCC): ICD-10-CM

## 2025-05-21 NOTE — TELEPHONE ENCOUNTER
Hi    Pt requests a refill for rx: ATORVASTATIN.    Please send to: [facility]    Thank you.

## 2025-05-22 RX ORDER — ATORVASTATIN CALCIUM 40 MG/1
40 TABLET, FILM COATED ORAL DAILY
Qty: 90 TABLET | Refills: 3 | Status: SHIPPED | OUTPATIENT
Start: 2025-05-22

## 2025-05-30 LAB
ALBUMIN/CREAT UR: 5 MG/G CREAT (ref 0–29)
BUN SERPL-MCNC: 9 MG/DL (ref 8–27)
BUN/CREAT SERPL: 11 (ref 12–28)
CALCIUM SERPL-MCNC: 9.8 MG/DL (ref 8.7–10.3)
CHLORIDE SERPL-SCNC: 104 MMOL/L (ref 96–106)
CHOLEST SERPL-MCNC: 120 MG/DL (ref 100–199)
CO2 SERPL-SCNC: 21 MMOL/L (ref 20–29)
CREAT SERPL-MCNC: 0.85 MG/DL (ref 0.57–1)
CREAT UR-MCNC: 69.9 MG/DL
EGFRCR SERPLBLD CKD-EPI 2021: 76 ML/MIN/1.73
GLUCOSE SERPL-MCNC: 99 MG/DL (ref 70–99)
HBA1C MFR BLD: 6.5 % (ref 4.8–5.6)
HDLC SERPL-MCNC: 42 MG/DL
IMP & REVIEW OF LAB RESULTS: NORMAL
LDLC SERPL CALC-MCNC: 54 MG/DL (ref 0–99)
Lab: NORMAL
MICROALBUMIN UR-MCNC: 3.4 UG/ML
POTASSIUM SERPL-SCNC: 4.7 MMOL/L (ref 3.5–5.2)
SODIUM SERPL-SCNC: 141 MMOL/L (ref 134–144)
TRIGL SERPL-MCNC: 137 MG/DL (ref 0–149)
VLDLC SERPL CALC-MCNC: 24 MG/DL (ref 5–40)

## 2025-06-05 ENCOUNTER — OFFICE VISIT (OUTPATIENT)
Age: 65
End: 2025-06-05
Payer: OTHER GOVERNMENT

## 2025-06-05 VITALS
TEMPERATURE: 97.1 F | SYSTOLIC BLOOD PRESSURE: 123 MMHG | HEIGHT: 63 IN | WEIGHT: 157 LBS | BODY MASS INDEX: 27.82 KG/M2 | HEART RATE: 67 BPM | DIASTOLIC BLOOD PRESSURE: 66 MMHG | RESPIRATION RATE: 15 BRPM | OXYGEN SATURATION: 99 %

## 2025-06-05 DIAGNOSIS — E78.2 MIXED HYPERLIPIDEMIA: ICD-10-CM

## 2025-06-05 DIAGNOSIS — I10 ESSENTIAL HYPERTENSION: ICD-10-CM

## 2025-06-05 DIAGNOSIS — E11.65 TYPE 2 DIABETES MELLITUS WITH HYPERGLYCEMIA, WITHOUT LONG-TERM CURRENT USE OF INSULIN (HCC): Primary | ICD-10-CM

## 2025-06-05 PROCEDURE — 99214 OFFICE O/P EST MOD 30 MIN: CPT | Performed by: INTERNAL MEDICINE

## 2025-06-05 PROCEDURE — 3074F SYST BP LT 130 MM HG: CPT | Performed by: INTERNAL MEDICINE

## 2025-06-05 PROCEDURE — 3078F DIAST BP <80 MM HG: CPT | Performed by: INTERNAL MEDICINE

## 2025-06-05 PROCEDURE — 3044F HG A1C LEVEL LT 7.0%: CPT | Performed by: INTERNAL MEDICINE

## 2025-06-05 ASSESSMENT — PATIENT HEALTH QUESTIONNAIRE - PHQ9
SUM OF ALL RESPONSES TO PHQ QUESTIONS 1-9: 0
1. LITTLE INTEREST OR PLEASURE IN DOING THINGS: NOT AT ALL
2. FEELING DOWN, DEPRESSED OR HOPELESS: NOT AT ALL
SUM OF ALL RESPONSES TO PHQ QUESTIONS 1-9: 0

## 2025-06-05 NOTE — PROGRESS NOTES
Judy Anne is a 64 y.o. female here for   Chief Complaint   Patient presents with    Diabetes       1. Have you been to the ER, urgent care clinic since your last visit?  Hospitalized since your last visit? - NO    2. Have you seen or consulted any other health care providers outside of the [facility] since your last visit?  Include any pap smears or colon screening.-  Cardiologist Dr. Jeffries

## 2025-06-05 NOTE — PROGRESS NOTES
[facility] DIABETES AND ENDOCRINOLOGY                 Bronwyn Berry MD            Patient Information     Name : Judy Corado    YOB: 1960           Referred by: Isael Munoz PA                Chief Complaint   Patient presents with    Diabetes              History of Present Illness: Judy Corado  is here for fu of   Type 2 Diabetes Mellitus.       Type 2 Diabetes was diagnosed in 2009  .       History of Present Illness      The patient presents for evaluation and management of diabetes mellitus.    Diabetes Mellitus  - The patient adheres to a regimen of Trulicity and Synjardy without experiencing any adverse effects.  - The patient missed a dose of Trulicity this week due to a delay in prescription fulfillment, with plans to resume the medication by Monday.  - The patient expresses interest in Mounjaro for weight management.    Lower Extremity Edema  - The patient continues to experience lower extremity edema without associated discomfort.          Prior history  She had UTI/pyelonephritis/sepsis, antibiotics helped with the diarrhea  too, she had chronic diarrhea      On Humira for psoriasis           Physical Examination:        General: pleasant, no distress, good eye contact    HEENT: no exophthalmos, no periorbital edema, EOMI    Neck: No thyromegaly    CVS: S1-S2 regular        RS: Normal respiratory effort    Musculoskeletal: no tremors    Neurological: alert and oriented    Psychiatric: normal mood and affect    Skin: Normal color      Data Reviewed:          Lab Results   Component Value Date    GFRAA >60 01/16/2021        Lab Results   Component Value Date    LABA1C 6.5 (H) 05/29/2025    LABA1C 6.5 (H) 01/13/2025    LABA1C 7.0 (H) 06/12/2024         Lab Results   Component Value Date    TRIG 137 05/29/2025    HDL 42 05/29/2025     05/29/2025    K 4.7 05/29/2025     05/29/2025    CO2 21 05/29/2025    BUN 9 05/29/2025    CREATININE 0.85

## 2025-06-11 ENCOUNTER — TELEPHONE (OUTPATIENT)
Age: 65
End: 2025-06-11

## 2025-06-11 DIAGNOSIS — E11.65 TYPE 2 DIABETES MELLITUS WITH HYPERGLYCEMIA, WITHOUT LONG-TERM CURRENT USE OF INSULIN (HCC): Primary | ICD-10-CM

## 2025-06-11 RX ORDER — DULAGLUTIDE 3 MG/.5ML
3 INJECTION, SOLUTION SUBCUTANEOUS WEEKLY
Qty: 6 ML | Refills: 3 | Status: ACTIVE | OUTPATIENT
Start: 2025-06-11

## 2025-06-23 ENCOUNTER — TELEPHONE (OUTPATIENT)
Age: 65
End: 2025-06-23

## 2025-06-23 DIAGNOSIS — E11.65 TYPE 2 DIABETES MELLITUS WITH HYPERGLYCEMIA, WITHOUT LONG-TERM CURRENT USE OF INSULIN (HCC): ICD-10-CM

## 2025-07-18 DIAGNOSIS — E11.65 TYPE 2 DIABETES MELLITUS WITH HYPERGLYCEMIA, WITHOUT LONG-TERM CURRENT USE OF INSULIN (HCC): Primary | ICD-10-CM

## 2025-07-18 RX ORDER — EMPAGLIFLOZIN AND METFORMIN HYDROCHLORIDE 12.5; 1 MG/1; MG/1
1 TABLET ORAL 2 TIMES DAILY
Qty: 28 TABLET | Refills: 0 | Status: ACTIVE | OUTPATIENT
Start: 2025-07-18

## 2025-07-18 NOTE — TELEPHONE ENCOUNTER
Patient asking for 7-14 day supply of synjardy sent to [address] as she ran out and [facility] held up the delivery

## (undated) DEVICE — CANNULA NSL O2 AD 7 FT END-TIDAL CARBON DIOX VENTFLO

## (undated) DEVICE — KIT ENDOSCOPIC  PROC VIA

## (undated) DEVICE — FORCEPS BX 240CM 2.4MM L NDL RAD JAW 4 M00513334